# Patient Record
Sex: MALE | Race: WHITE | NOT HISPANIC OR LATINO | Employment: UNEMPLOYED | URBAN - METROPOLITAN AREA
[De-identification: names, ages, dates, MRNs, and addresses within clinical notes are randomized per-mention and may not be internally consistent; named-entity substitution may affect disease eponyms.]

---

## 2020-07-17 ENCOUNTER — APPOINTMENT (OUTPATIENT)
Dept: MRI IMAGING | Facility: HOSPITAL | Age: 40
End: 2020-07-17

## 2020-07-17 ENCOUNTER — HOSPITAL ENCOUNTER (EMERGENCY)
Facility: HOSPITAL | Age: 40
Discharge: HOME | End: 2020-07-17
Attending: EMERGENCY MEDICINE | Admitting: EMERGENCY MEDICINE

## 2020-07-17 VITALS
HEIGHT: 73 IN | RESPIRATION RATE: 18 BRPM | TEMPERATURE: 98.5 F | OXYGEN SATURATION: 95 % | WEIGHT: 215 LBS | HEART RATE: 90 BPM | BODY MASS INDEX: 28.49 KG/M2 | SYSTOLIC BLOOD PRESSURE: 139 MMHG | DIASTOLIC BLOOD PRESSURE: 95 MMHG

## 2020-07-17 DIAGNOSIS — M54.2 NECK PAIN: ICD-10-CM

## 2020-07-17 DIAGNOSIS — M54.42 ACUTE BILATERAL LOW BACK PAIN WITH LEFT-SIDED SCIATICA: ICD-10-CM

## 2020-07-17 DIAGNOSIS — S39.012A STRAIN OF LUMBAR REGION, INITIAL ENCOUNTER: ICD-10-CM

## 2020-07-17 DIAGNOSIS — S16.1XXA ACUTE STRAIN OF NECK MUSCLE, INITIAL ENCOUNTER: Primary | ICD-10-CM

## 2020-07-17 PROCEDURE — 72141 MRI NECK SPINE W/O DYE: CPT

## 2020-07-17 PROCEDURE — 72148 MRI LUMBAR SPINE W/O DYE: CPT

## 2020-07-17 PROCEDURE — 99284 EMERGENCY DEPT VISIT MOD MDM: CPT

## 2020-07-17 PROCEDURE — 99283 EMERGENCY DEPT VISIT LOW MDM: CPT

## 2020-07-17 RX ORDER — CYCLOBENZAPRINE HCL 10 MG
10 TABLET ORAL ONCE
Status: COMPLETED | OUTPATIENT
Start: 2020-07-17 | End: 2020-07-17

## 2020-07-17 RX ORDER — CYCLOBENZAPRINE HCL 10 MG
10 TABLET ORAL 3 TIMES DAILY PRN
Qty: 20 TABLET | Refills: 0 | Status: SHIPPED | OUTPATIENT
Start: 2020-07-17 | End: 2021-02-17

## 2020-07-17 RX ORDER — HYDROCODONE BITARTRATE AND ACETAMINOPHEN 10; 325 MG/1; MG/1
1 TABLET ORAL ONCE
Status: COMPLETED | OUTPATIENT
Start: 2020-07-17 | End: 2020-07-17

## 2020-07-17 RX ADMIN — CYCLOBENZAPRINE HYDROCHLORIDE 10 MG: 10 TABLET, FILM COATED ORAL at 14:59

## 2020-07-17 RX ADMIN — HYDROCODONE BITARTRATE AND ACETAMINOPHEN 1 TABLET: 10; 325 TABLET ORAL at 14:59

## 2020-07-17 NOTE — DISCHARGE INSTRUCTIONS
Rest with range of motion exercises only.  No vigorous physical activity or stressful activity    Use Tylenol or over-the-counter anti-inflammatory medications as needed for pain.    Use Flexeril as needed for muscle spasm.  Do not take Flexeril if he has any side effects.  No driving or dangerous activity as this is a sedating medication    You may use cool compresses or gentle heat to site    Follow-up with Dr. Grady at the beginning of the week to arrange physical therapy, which will be very important to your convalescence, as well as for close follow-up in view of your persistent pain and symptoms.    Follow-up with 1 of the primary care physicians from the list below for primary care treatment and coordination with orthopedist care    Return to the ED at once if you have any acute neurologic symptoms or other acute urgent emergent or progressive symptoms as discussed    Follow up with one of the [facility] Primary Care Providers below to setup primary care. If you need assistance coordinating a primary care appointment with a [facility] Primary Care Provider, please contact the Primary Care Coordinators at [phone] for appointment scheduling.    [facility], Primary Care   [address], [address]   [place], [place] [address] [phone]    [facility] Internal Medicine & Endocrinology  [address], [address]  [place] [address] [phone]    [facility] Family Medicine  [address], [address]   [place], [place] [address] [phone]    [facility] Primary Care  [address], [address]  [place], [place] [address] [phone]    [facility], Primary Care,   [address], [address]   [place], [place] [address] [phone]    [facility] Primary Care  [address]  [place], [place] [address] [phone]    [facility]  [facility], Primary Care  [address], [address]  [place] [address] [phone]    [facility] Internal Medicine & Pediatrics  [address], [address]   [place] [address] [phone]    [facility], Primary Care  [address] [place] [address] [phone]      [facility] Primary Care  [address], [address]   [place] [address] [phone]    [facility] Family Medicine  [address] [place] [address] [phone]
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
## 2020-07-20 ENCOUNTER — TELEPHONE (OUTPATIENT)
Dept: FAMILY MEDICINE CLINIC | Facility: CLINIC | Age: 40
End: 2020-07-20

## 2020-07-20 ENCOUNTER — OFFICE VISIT (OUTPATIENT)
Dept: FAMILY MEDICINE CLINIC | Facility: CLINIC | Age: 40
End: 2020-07-20

## 2020-07-20 VITALS
TEMPERATURE: 98.2 F | HEIGHT: 74 IN | OXYGEN SATURATION: 99 % | WEIGHT: 216 LBS | RESPIRATION RATE: 14 BRPM | DIASTOLIC BLOOD PRESSURE: 82 MMHG | BODY MASS INDEX: 27.72 KG/M2 | HEART RATE: 115 BPM | SYSTOLIC BLOOD PRESSURE: 130 MMHG

## 2020-07-20 DIAGNOSIS — M54.42 ACUTE BILATERAL LOW BACK PAIN WITH LEFT-SIDED SCIATICA: Primary | ICD-10-CM

## 2020-07-20 DIAGNOSIS — M54.2 NECK PAIN: ICD-10-CM

## 2020-07-20 PROCEDURE — 99203 OFFICE O/P NEW LOW 30 MIN: CPT | Performed by: PHYSICIAN ASSISTANT

## 2020-07-20 RX ORDER — DIVALPROEX SODIUM 500 MG/1
500 TABLET, DELAYED RELEASE ORAL 2 TIMES DAILY
COMMUNITY

## 2020-07-20 RX ORDER — CLONIDINE HYDROCHLORIDE 0.2 MG/1
0.2 TABLET ORAL
COMMUNITY

## 2020-07-20 RX ORDER — CLONAZEPAM 2 MG/1
2 TABLET ORAL 2 TIMES DAILY PRN
COMMUNITY

## 2020-07-20 RX ORDER — QUETIAPINE 200 MG/1
200 TABLET, FILM COATED, EXTENDED RELEASE ORAL NIGHTLY
COMMUNITY
End: 2020-11-19

## 2020-07-20 RX ORDER — HYDROCODONE BITARTRATE AND ACETAMINOPHEN 5; 325 MG/1; MG/1
1 TABLET ORAL EVERY 6 HOURS PRN
Qty: 20 TABLET | Refills: 0 | Status: SHIPPED | OUTPATIENT
Start: 2020-07-20 | End: 2021-02-17

## 2020-07-20 RX ORDER — FLUVOXAMINE MALEATE 100 MG/1
CAPSULE, EXTENDED RELEASE ORAL
COMMUNITY
End: 2020-11-19

## 2020-07-20 NOTE — TELEPHONE ENCOUNTER
Request for medication has been sent to doctor. May take awhile to be approved as they are still seeing patients. MARLEN

## 2020-07-20 NOTE — TELEPHONE ENCOUNTER
PT CALLED TO CHECK IF A PAIN MEDICATIONW AS CALLED IN FOR HIM  PT DID NOT KNOW THE NAME OF THE MEDICATION    [facility] [address] [place] [address], [place] [facility] [address] [facility] [phone] [facility] [phone]      PT CALL BACK NUMBER [id number]
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
## 2020-09-01 ENCOUNTER — OFFICE VISIT (OUTPATIENT)
Dept: FAMILY MEDICINE CLINIC | Facility: CLINIC | Age: 40
End: 2020-09-01

## 2020-09-01 ENCOUNTER — TELEPHONE (OUTPATIENT)
Dept: FAMILY MEDICINE CLINIC | Facility: CLINIC | Age: 40
End: 2020-09-01

## 2020-09-01 VITALS
OXYGEN SATURATION: 99 % | WEIGHT: 222 LBS | HEART RATE: 130 BPM | HEIGHT: 73 IN | TEMPERATURE: 98.4 F | BODY MASS INDEX: 29.42 KG/M2 | SYSTOLIC BLOOD PRESSURE: 130 MMHG | DIASTOLIC BLOOD PRESSURE: 86 MMHG | RESPIRATION RATE: 16 BRPM

## 2020-09-01 DIAGNOSIS — S39.91XA INJURY OF ABDOMINAL WALL, INITIAL ENCOUNTER: ICD-10-CM

## 2020-09-01 DIAGNOSIS — R16.1 SPLEEN ENLARGEMENT: ICD-10-CM

## 2020-09-01 DIAGNOSIS — R10.12 LEFT UPPER QUADRANT ABDOMINAL PAIN: Primary | ICD-10-CM

## 2020-09-01 DIAGNOSIS — R11.0 NAUSEA: ICD-10-CM

## 2020-09-01 DIAGNOSIS — R00.0 TACHYCARDIA: ICD-10-CM

## 2020-09-01 PROCEDURE — 99214 OFFICE O/P EST MOD 30 MIN: CPT | Performed by: PHYSICIAN ASSISTANT

## 2020-09-01 NOTE — TELEPHONE ENCOUNTER
Gabrielle from [facility] [employer] needs Yariel to sign off on most recent office note for CT to be cleared.

## 2020-09-01 NOTE — PROGRESS NOTES
Subjective   Deonte Galaviz is a 40 y.o. male.     History of Present Illness   Pt presents with CC of LUQ pain/ pain up under left rib cage   Pain started last Thursday when young nephew jumped on pt and kneed abdomen. Has sensation of pain and fullness ever since. Did debate on going to ER   States this area has hurt off and on for years. Sensation of fullness/ and dull ache of LUQ   Started 15 years ago after bad bout of mono. Told his spleen was enlarged and liver enzymes were elevated. No recent labs. Pt states enlarged spleen did not resolve after mono infection. No hx of blood disorder, frequent infections, easy bleeding or anemia   associated symptoms include nausea  No vomiting   occasional diarrhea  No fever. Denies chest pain or SOB   Is noted to be tachycardic in office, similar to last visit. States he is in a lot of discomfort   Has not tried anything for symptoms     The following portions of the patient's history were reviewed and updated as appropriate: allergies, current medications, past family history, past medical history, past social history, past surgical history and problem list.    Review of Systems   Constitutional: Negative.  Negative for chills, diaphoresis, fatigue and fever.   HENT: Negative.  Negative for congestion, ear discharge, ear pain, hearing loss, nosebleeds, postnasal drip, sinus pressure, sneezing and sore throat.    Eyes: Negative.    Respiratory: Negative.  Negative for cough, chest tightness, shortness of breath and wheezing.    Cardiovascular: Negative.  Negative for chest pain, palpitations and leg swelling.   Gastrointestinal: Positive for abdominal pain, diarrhea and nausea. Negative for abdominal distention, blood in stool, constipation and vomiting.   Genitourinary: Negative.  Negative for difficulty urinating, dysuria, flank pain, frequency, hematuria and urgency.   Skin: Negative.  Negative for color change, pallor, rash and wound.   Neurological: Negative for  "dizziness, syncope, weakness, light-headedness, numbness and headaches.       Objective    Blood pressure 130/86, pulse (!) 130, temperature 98.4 °F (36.9 °C), resp. rate 16, height 185.4 cm (73\"), weight 101 kg (222 lb), SpO2 99 %.     Physical Exam   Constitutional: He is oriented to person, place, and time. He appears well-developed and well-nourished.   HENT:   Head: Normocephalic and atraumatic.   Right Ear: External ear normal.   Left Ear: External ear normal.   Nose: Nose normal.   Mouth/Throat: Oropharynx is clear and moist. No oropharyngeal exudate.   Eyes: Conjunctivae are normal.   Neck: Normal range of motion. Neck supple. No tracheal deviation present. No thyromegaly present.   Cardiovascular: Regular rhythm, normal heart sounds and intact distal pulses. Tachycardia present. Exam reveals no gallop and no friction rub.   No murmur heard.  Pulmonary/Chest: Effort normal and breath sounds normal. No respiratory distress. He has no wheezes. He has no rales. He exhibits no tenderness.   Abdominal: Soft. Bowel sounds are normal. He exhibits no distension and no mass. There is tenderness in the left upper quadrant. There is guarding. There is no rebound. No hernia.       Musculoskeletal: Normal range of motion. He exhibits no edema, tenderness or deformity.   Lymphadenopathy:     He has no cervical adenopathy.   Neurological: He is alert and oriented to person, place, and time.   Skin: Skin is warm and dry.   Psychiatric: He has a normal mood and affect. His behavior is normal. Judgment and thought content normal.   Nursing note and vitals reviewed.      Assessment/Plan   Deonte was seen today for rib pain-l side.    Diagnoses and all orders for this visit:    Left upper quadrant abdominal pain  -     CBC & Differential  -     Comprehensive Metabolic Panel  -     Lipase  -     D-dimer, Quantitative  -     CT Abdomen Pelvis With & Without Contrast    Nausea  -     TSH  -     CT Abdomen Pelvis With & Without " Contrast    Spleen enlargement  -     Protime-INR  -     APTT  -     CT Abdomen Pelvis With & Without Contrast    Injury of abdominal wall, initial encounter  -     CT Abdomen Pelvis With & Without Contrast    Tachycardia  -     D-dimer, Quantitative    will proceed with CT imaging as noted. Pt concerned about recent injury and history of enlarged spleen.   Check labs as outlined in plan   Follow up pending imaging   Report to ER if new or worsening symptoms.

## 2020-09-02 ENCOUNTER — APPOINTMENT (OUTPATIENT)
Dept: CT IMAGING | Facility: HOSPITAL | Age: 40
End: 2020-09-02

## 2020-09-02 LAB
ALBUMIN SERPL-MCNC: 4 G/DL (ref 4–5)
ALBUMIN/GLOB SERPL: 1.8 {RATIO} (ref 1.2–2.2)
ALP SERPL-CCNC: 73 IU/L (ref 39–117)
ALT SERPL-CCNC: 39 IU/L (ref 0–44)
APTT PPP: 27 SEC (ref 24–33)
AST SERPL-CCNC: 32 IU/L (ref 0–40)
BASOPHILS # BLD AUTO: 0 X10E3/UL (ref 0–0.2)
BASOPHILS NFR BLD AUTO: 0 %
BILIRUB SERPL-MCNC: 0.3 MG/DL (ref 0–1.2)
BUN SERPL-MCNC: 20 MG/DL (ref 6–24)
BUN/CREAT SERPL: 25 (ref 9–20)
CALCIUM SERPL-MCNC: 9 MG/DL (ref 8.7–10.2)
CHLORIDE SERPL-SCNC: 108 MMOL/L (ref 96–106)
CO2 SERPL-SCNC: 26 MMOL/L (ref 20–29)
CREAT SERPL-MCNC: 0.8 MG/DL (ref 0.76–1.27)
D DIMER PPP FEU-MCNC: 0.45 MG/L FEU (ref 0–0.49)
EOSINOPHIL # BLD AUTO: 0.1 X10E3/UL (ref 0–0.4)
EOSINOPHIL NFR BLD AUTO: 2 %
ERYTHROCYTE [DISTWIDTH] IN BLOOD BY AUTOMATED COUNT: 12.6 % (ref 11.6–15.4)
GLOBULIN SER CALC-MCNC: 2.2 G/DL (ref 1.5–4.5)
GLUCOSE SERPL-MCNC: 91 MG/DL (ref 65–99)
HCT VFR BLD AUTO: 39.6 % (ref 37.5–51)
HGB BLD-MCNC: 13.2 G/DL (ref 13–17.7)
IMM GRANULOCYTES # BLD AUTO: 0 X10E3/UL (ref 0–0.1)
IMM GRANULOCYTES NFR BLD AUTO: 0 %
INR PPP: 1 (ref 0.8–1.2)
LIPASE SERPL-CCNC: 29 U/L (ref 13–78)
LYMPHOCYTES # BLD AUTO: 1.3 X10E3/UL (ref 0.7–3.1)
LYMPHOCYTES NFR BLD AUTO: 26 %
MCH RBC QN AUTO: 32 PG (ref 26.6–33)
MCHC RBC AUTO-ENTMCNC: 33.3 G/DL (ref 31.5–35.7)
MCV RBC AUTO: 96 FL (ref 79–97)
MONOCYTES # BLD AUTO: 0.6 X10E3/UL (ref 0.1–0.9)
MONOCYTES NFR BLD AUTO: 12 %
NEUTROPHILS # BLD AUTO: 2.9 X10E3/UL (ref 1.4–7)
NEUTROPHILS NFR BLD AUTO: 60 %
PLATELET # BLD AUTO: 317 X10E3/UL (ref 150–450)
POTASSIUM SERPL-SCNC: 4.3 MMOL/L (ref 3.5–5.2)
PROT SERPL-MCNC: 6.2 G/DL (ref 6–8.5)
PROTHROMBIN TIME: 10.6 SEC (ref 9.1–12)
RBC # BLD AUTO: 4.12 X10E6/UL (ref 4.14–5.8)
SODIUM SERPL-SCNC: 146 MMOL/L (ref 134–144)
TSH SERPL DL<=0.005 MIU/L-ACNC: 2.2 UIU/ML (ref 0.45–4.5)
WBC # BLD AUTO: 4.8 X10E3/UL (ref 3.4–10.8)

## 2020-11-19 ENCOUNTER — OFFICE VISIT (OUTPATIENT)
Dept: FAMILY MEDICINE CLINIC | Facility: CLINIC | Age: 40
End: 2020-11-19

## 2020-11-19 DIAGNOSIS — J20.9 ACUTE BRONCHITIS, UNSPECIFIED ORGANISM: Primary | ICD-10-CM

## 2020-11-19 PROCEDURE — 99442 PR PHYS/QHP TELEPHONE EVALUATION 11-20 MIN: CPT | Performed by: PHYSICIAN ASSISTANT

## 2020-11-19 RX ORDER — QUETIAPINE FUMARATE 200 MG/1
TABLET, FILM COATED ORAL
COMMUNITY
Start: 2020-10-30

## 2020-11-19 RX ORDER — FLUVOXAMINE MALEATE 100 MG
TABLET ORAL
COMMUNITY
Start: 2020-09-16

## 2020-11-19 RX ORDER — METHYLPREDNISOLONE 4 MG/1
TABLET ORAL
Qty: 21 EACH | Refills: 0 | Status: SHIPPED | OUTPATIENT
Start: 2020-11-19 | End: 2021-02-17

## 2020-11-19 RX ORDER — ALBUTEROL SULFATE 90 UG/1
2 AEROSOL, METERED RESPIRATORY (INHALATION) EVERY 4 HOURS PRN
Qty: 18 G | Refills: 0 | Status: SHIPPED | OUTPATIENT
Start: 2020-11-19

## 2020-11-19 RX ORDER — BENZONATATE 200 MG/1
200 CAPSULE ORAL 3 TIMES DAILY PRN
Qty: 30 CAPSULE | Refills: 0 | Status: SHIPPED | OUTPATIENT
Start: 2020-11-19 | End: 2020-11-23 | Stop reason: SDUPTHER

## 2020-11-19 RX ORDER — AZITHROMYCIN 250 MG/1
TABLET, FILM COATED ORAL
Qty: 6 TABLET | Refills: 0 | Status: SHIPPED | OUTPATIENT
Start: 2020-11-19 | End: 2021-02-17

## 2020-11-19 NOTE — PROGRESS NOTES
Subjective   Deonte Galaviz is a 40 y.o. male.     You have chosen to receive care through a telephone visit. Do you consent to use a telephone visit for your medical care today? Yes    History of Present Illness   Pt presents for telephone visit with CC of cough with mucus production (yellow), congestion, wheezing and SOB over the last several days   Lungs hurting he has been coughing so hard   No fever   No chills  No sudden loss of taste or smell   Has not gotten screened for COVID     The following portions of the patient's history were reviewed and updated as appropriate: allergies, current medications, past family history, past medical history, past social history, past surgical history and problem list.    Review of Systems   Constitutional: Positive for fatigue. Negative for chills, diaphoresis and fever.   HENT: Positive for congestion. Negative for ear discharge, ear pain, hearing loss, nosebleeds, postnasal drip, sinus pressure, sneezing and sore throat.    Eyes: Negative.    Respiratory: Positive for cough, chest tightness, shortness of breath and wheezing.    Cardiovascular: Negative.  Negative for chest pain, palpitations and leg swelling.   Gastrointestinal: Negative for abdominal distention, abdominal pain, blood in stool, constipation, diarrhea, nausea and vomiting.   Genitourinary: Negative for urgency.   Musculoskeletal: Positive for myalgias.   Skin: Negative for rash.   Neurological: Negative for dizziness, syncope, light-headedness and headaches.       Objective    Physical Exam   Unable to perform physical exam due to telephone visit     Assessment/Plan   Diagnoses and all orders for this visit:    1. Acute bronchitis, unspecified organism (Primary)  -     azithromycin (Zithromax Z-Aftab) 250 MG tablet; Take 2 tablets the first day, then 1 tablet daily for 4 days.  Dispense: 6 tablet; Refill: 0  -     methylPREDNISolone (MEDROL) 4 MG dose pack; Take as directed on package instructions.   Dispense: 21 each; Refill: 0  -     albuterol sulfate  (90 Base) MCG/ACT inhaler; Inhale 2 puffs Every 4 (Four) Hours As Needed for Wheezing or Shortness of Air.  Dispense: 18 g; Refill: 0  -     benzonatate (TESSALON) 200 MG capsule; Take 1 capsule by mouth 3 (Three) Times a Day As Needed for Cough.  Dispense: 30 capsule; Refill: 0  -     COVID-19,[facility] ROUTINE, NP/OP SWAB IN TRANSPORT MEDIA OR ESWAB 72 HR TAT - Swab, Nasopharynx    symptoms appear consistent with bronchitis. begin treatment as outlined in plan. Encourage screening for COVID 19. Pt will report to office   F/u pending COVID testing.   Report to UC or ER if new or worsening symptoms develop     This visit has been rescheduled as a phone visit to comply with patient safety concerns in accordance with CDC recommendations. Total time of discussion was 12 minutes.

## 2020-11-23 ENCOUNTER — TELEPHONE (OUTPATIENT)
Dept: FAMILY MEDICINE CLINIC | Facility: CLINIC | Age: 40
End: 2020-11-23

## 2020-11-23 DIAGNOSIS — J20.9 ACUTE BRONCHITIS, UNSPECIFIED ORGANISM: ICD-10-CM

## 2020-11-23 RX ORDER — BENZONATATE 200 MG/1
200 CAPSULE ORAL 3 TIMES DAILY PRN
Qty: 30 CAPSULE | Refills: 0 | Status: SHIPPED | OUTPATIENT
Start: 2020-11-23 | End: 2021-02-17

## 2020-11-23 NOTE — TELEPHONE ENCOUNTER
Caller: Anastacia Deonte    Relationship: Self    Best call back number:[phone]   Medication needed:   Requested Prescriptions     Pending Prescriptions Disp Refills   • benzonatate (TESSALON) 200 MG capsule 30 capsule 0     Sig: Take 1 capsule by mouth 3 (Three) Times a Day As Needed for Cough.       When do you need the refill by: ASAP - NEEDS THIS WEEK PLEASE  What details did the patient provide when requesting the medication: DROPPED MEDICATION AND THEY GOT WET    Does the patient have less than a 3 day supply:  [] Yes  [x] No    What is the patient's preferred [facility] [place] [address] [facility] [place] [facility] [phone] [facility] [phone]             no vomiting/no fever

## 2020-11-23 NOTE — TELEPHONE ENCOUNTER
Caller: Deonte Galaviz    Relationship to patient: Self    Best call back number: [phone]    THE PATIENT IS REQUESTING A CALL BACK WITH WITH COVID 19 TEST RESULTS.     PLEASE CALL PATIENT [phone]

## 2020-11-24 ENCOUNTER — TELEPHONE (OUTPATIENT)
Dept: FAMILY MEDICINE CLINIC | Facility: CLINIC | Age: 40
End: 2020-11-24

## 2020-11-24 LAB — SPECIMEN STATUS: NORMAL

## 2020-11-24 NOTE — TELEPHONE ENCOUNTER
PATIENT CALLED AND WANTED TO KNOW THE RESULTS OF HIS COVD TEST; PLEASE CALL AND ADVISE    KANIKA: [phone]

## 2020-11-24 NOTE — TELEPHONE ENCOUNTER
Ok let patient know. Unsure why they are not back yet. Encourage if we do not get results back in next 24 hours he go to outside testing site for rapid testing. There are no charges for covid testing. MARLEN

## 2020-11-24 NOTE — TELEPHONE ENCOUNTER
"Not seeing results. Did patient come in day of telephone visit 11/19 or the following day? Please check with Melissa to see if results are available. By order states \"needs to be collected\". Please see what [facility] says and contact patient with findings. Results should not be taking this long. KNH  "

## 2020-11-25 NOTE — TELEPHONE ENCOUNTER
Per Lilo ([facility] Tech) results are still pending. Thinking bc they are so backed up.     Lvm informing pt of this.

## 2020-11-26 LAB
SARS-COV-2 RNA RESP QL NAA+PROBE: NOT DETECTED
WRITTEN AUTHORIZATION: NORMAL

## 2021-02-17 ENCOUNTER — OFFICE VISIT (OUTPATIENT)
Dept: FAMILY MEDICINE CLINIC | Facility: CLINIC | Age: 41
End: 2021-02-17

## 2021-02-17 ENCOUNTER — HOSPITAL ENCOUNTER (OUTPATIENT)
Dept: CT IMAGING | Facility: HOSPITAL | Age: 41
Discharge: HOME | End: 2021-02-17

## 2021-02-17 ENCOUNTER — HOSPITAL ENCOUNTER (OUTPATIENT)
Dept: GENERAL RADIOLOGY | Facility: HOSPITAL | Age: 41
Discharge: HOME | End: 2021-02-17

## 2021-02-17 VITALS
SYSTOLIC BLOOD PRESSURE: 140 MMHG | WEIGHT: 263 LBS | OXYGEN SATURATION: 98 % | TEMPERATURE: 97.8 F | DIASTOLIC BLOOD PRESSURE: 82 MMHG | HEART RATE: 110 BPM | BODY MASS INDEX: 34.85 KG/M2 | RESPIRATION RATE: 18 BRPM | HEIGHT: 73 IN
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
## 2021-02-17 DIAGNOSIS — S09.93XA FACIAL INJURY, INITIAL ENCOUNTER: ICD-10-CM

## 2021-02-17 DIAGNOSIS — R07.81 RIB PAIN ON LEFT SIDE: ICD-10-CM

## 2021-02-17 PROCEDURE — 71101 X-RAY EXAM UNILAT RIBS/CHEST: CPT

## 2021-02-17 PROCEDURE — 70486 CT MAXILLOFACIAL W/O DYE: CPT

## 2021-02-17 PROCEDURE — 99213 OFFICE O/P EST LOW 20 MIN: CPT | Performed by: PHYSICIAN ASSISTANT

## 2021-02-17 RX ORDER — HYDROCODONE BITARTRATE AND ACETAMINOPHEN 7.5; 325 MG/1; MG/1
1 TABLET ORAL EVERY 6 HOURS PRN
Qty: 12 TABLET | Refills: 0 | Status: SHIPPED | OUTPATIENT
Start: 2021-02-17 | End: 2021-02-22 | Stop reason: SDUPTHER

## 2021-02-17 NOTE — PROGRESS NOTES
Subjective   Deonte Galaviz is a 40 y.o. male.     History of Present Illness   Pt presents following a fall on ice the day before yesterday   Was at home in driveway when he fell   Slipped on patch of ice landing on left side of face, nose and side  States nose took a brunt of the hit. Had nose bleed for about 2 hours after the fall. Bridge of nose very sore. Can breathe okay out nose now. Some bruising around nose   HA a couple hours after fall  Felt nauseated and sick to stomach. No vomiting   L orbit pain. Mild swelling   Pain along L lower rib cage extends to sternum   Denies vision changes  No SOB   Denies any significant back, neck, hip or knee injury during fall     Did not report to ER     The following portions of the patient's history were reviewed and updated as appropriate: allergies, current medications, past family history, past medical history, past social history, past surgical history and problem list.    Review of Systems   Constitutional: Negative.  Negative for chills, diaphoresis and fever.   HENT: Positive for facial swelling and nosebleeds. Negative for congestion, ear discharge, ear pain, hearing loss, postnasal drip, sinus pressure, sneezing and sore throat.    Eyes: Negative.    Respiratory: Negative.  Negative for cough, chest tightness, shortness of breath and wheezing.    Cardiovascular: Negative.  Negative for chest pain, palpitations and leg swelling.   Gastrointestinal: Positive for nausea. Negative for abdominal distention, abdominal pain, blood in stool, constipation, diarrhea and vomiting.   Genitourinary: Negative.  Negative for difficulty urinating, dysuria, flank pain, frequency, hematuria and urgency.   Musculoskeletal: Positive for arthralgias. Negative for back pain, gait problem, joint swelling, myalgias, neck pain and neck stiffness.   Skin: Positive for color change. Negative for pallor, rash and wound.   Neurological: Positive for headaches. Negative for dizziness,  "syncope, weakness, light-headedness and numbness.       Objective    Blood pressure 140/82, pulse 110, temperature 97.8 °F (36.6 °C), resp. rate 18, height 185.4 cm (73\"), weight 119 kg (263 lb), SpO2 98 %.     Physical Exam  Vitals signs and nursing note reviewed.   Constitutional:       Appearance: Normal appearance. He is well-developed.   HENT:      Head: Normocephalic and atraumatic.      Right Ear: Tympanic membrane, ear canal and external ear normal.      Left Ear: Tympanic membrane, ear canal and external ear normal.      Nose: Signs of injury and nasal tenderness present. No laceration.      Mouth/Throat:      Pharynx: No oropharyngeal exudate.   Eyes:      Extraocular Movements: Extraocular movements intact.      Conjunctiva/sclera: Conjunctivae normal.      Pupils: Pupils are equal, round, and reactive to light.      Comments: Severe bony TTP along L orbit    Neck:      Musculoskeletal: Normal range of motion and neck supple.      Thyroid: No thyromegaly.      Trachea: No tracheal deviation.   Cardiovascular:      Rate and Rhythm: Normal rate and regular rhythm.      Heart sounds: Normal heart sounds.   Pulmonary:      Effort: Pulmonary effort is normal. No respiratory distress.      Breath sounds: Normal breath sounds. No wheezing or rales.   Chest:      Chest wall: Tenderness present.       Lymphadenopathy:      Cervical: No cervical adenopathy.   Skin:     General: Skin is warm and dry.   Neurological:      Mental Status: He is alert and oriented to person, place, and time.   Psychiatric:         Behavior: Behavior normal.         Thought Content: Thought content normal.         Judgment: Judgment normal.         Assessment/Plan   Diagnoses and all orders for this visit:    1. Fall, initial encounter (Primary)  -     Cancel: XR Ribs Left With PA Chest  -     CT facial bones wo contrast  -     HYDROcodone-acetaminophen (Norco) 7.5-325 MG per tablet; Take 1 tablet by mouth Every 6 (Six) Hours As Needed " for Moderate Pain .  Dispense: 12 tablet; Refill: 0  -     XR Ribs Left With PA Chest    2. Facial injury, initial encounter  -     CT facial bones wo contrast  -     HYDROcodone-acetaminophen (Norco) 7.5-325 MG per tablet; Take 1 tablet by mouth Every 6 (Six) Hours As Needed for Moderate Pain .  Dispense: 12 tablet; Refill: 0    3. Rib pain on left side  -     Cancel: XR Ribs Left With PA Chest  -     HYDROcodone-acetaminophen (Norco) 7.5-325 MG per tablet; Take 1 tablet by mouth Every 6 (Six) Hours As Needed for Moderate Pain .  Dispense: 12 tablet; Refill: 0  -     XR Ribs Left With PA Chest    proceed with XR of L ribs and CT of facial bones due to extent of injury and reports of pain   Temporary pain management as outlined in plan   F/u pending imaging

## 2021-02-19 ENCOUNTER — TELEPHONE (OUTPATIENT)
Dept: FAMILY MEDICINE CLINIC | Facility: CLINIC | Age: 41
End: 2021-02-19

## 2021-02-19 NOTE — TELEPHONE ENCOUNTER
Caller: Deonte Galaviz    Relationship: Self    Best call back number: [phone]    Caller requesting test results: DEONTE    What test was performed: CT SCAN AND X RAYS OF LEFT RIBS      When was the test performed: 02/17/21    Additional notes: DEONTE IS LOOKING FOR THE RESULTS

## 2021-02-22 DIAGNOSIS — R07.81 RIB PAIN ON LEFT SIDE: ICD-10-CM
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
## 2021-02-22 DIAGNOSIS — S09.93XA FACIAL INJURY, INITIAL ENCOUNTER: ICD-10-CM

## 2021-02-22 RX ORDER — HYDROCODONE BITARTRATE AND ACETAMINOPHEN 7.5; 325 MG/1; MG/1
1 TABLET ORAL EVERY 6 HOURS PRN
Qty: 12 TABLET | Refills: 0 | Status: SHIPPED | OUTPATIENT
Start: 2021-02-22 | End: 2021-03-23

## 2021-02-22 NOTE — TELEPHONE ENCOUNTER
im confused. I saw patient after a fall where he was having eye socket pain, bridge of nose pain and rib pain. No fracture on imaging. What is this about a dentist appointment? MARLEN

## 2021-02-22 NOTE — TELEPHONE ENCOUNTER
Dr. Ding,     Pt did not mention chipped tooth at appointment. I did not check inside mouth due to COVID precautions and no reports of dental injury at time of appointment. I would say 3 additional days of pain management to get him to dentist appontment and then he will need to alternate tylenol and ibuprofen as our office can only prescribe temporary pain management.     Yariel

## 2021-02-22 NOTE — TELEPHONE ENCOUNTER
Caller: Deonte Galaviz    Relationship: Self    Best call back number: [phone]     Medication needed:   Requested Prescriptions     Pending Prescriptions Disp Refills   • HYDROcodone-acetaminophen (Norco) 7.5-325 MG per tablet 12 tablet 0     Sig: Take 1 tablet by mouth Every 6 (Six) Hours As Needed for Moderate Pain .       What details did the patient provide when requesting the medication: PATIENT WAS PRESCRIBED 3 DAYS OF THIS MEDICATION AND HE THOUGHT HE WOULD GET IN TO SEE HIS DENTIST SOON  HE CALLED THE DENTIST AND THE NEXT AVAILABLE APPOINTMENT FOR HIM IS Thursday 2-25-21  HE IS REQUESTING ENOUGH MEDICATION UNTIL HIS APPOINTMENT   ALSO PATIENT STATES EYE SOCKETS ARE STILL HURTING      Does the patient have less than a 3 day supply:  [x] Yes  [] No    What is the patient's preferred pharmacy: [facility] [address] [place] - [phone] [facility] [phone]

## 2021-02-22 NOTE — TELEPHONE ENCOUNTER
PATIENT CALLED TO CHECK STATUS OF REFILL ON HIS HYDROCODONE.    PATIENT STATED HE CHIPPED A TOOTH  AND HAVING EXCRUCIATING PAIN BUT HE WAS NOT ABLE TO GET AN APPOINTMENT WITH HIS DENTIST UNTIL Thursday 02/25.      PLEASE ADVISE:  [phone]           PHARMACY:      [facility] [address] [place] [address] [place] - [phone] [facility] [phone]   [phone]

## 2021-03-23 ENCOUNTER — OFFICE VISIT (OUTPATIENT)
Dept: FAMILY MEDICINE CLINIC | Facility: CLINIC | Age: 41
End: 2021-03-23

## 2021-03-23 VITALS
RESPIRATION RATE: 18 BRPM | HEART RATE: 80 BPM | HEIGHT: 73 IN | TEMPERATURE: 97.1 F | BODY MASS INDEX: 34.06 KG/M2 | DIASTOLIC BLOOD PRESSURE: 84 MMHG | WEIGHT: 257 LBS | SYSTOLIC BLOOD PRESSURE: 124 MMHG

## 2021-03-23 DIAGNOSIS — L02.91 ABSCESS: Primary | ICD-10-CM

## 2021-03-23 DIAGNOSIS — L98.8 FISTULA: ICD-10-CM

## 2021-03-23 PROCEDURE — 99213 OFFICE O/P EST LOW 20 MIN: CPT | Performed by: FAMILY MEDICINE

## 2021-03-23 RX ORDER — CLINDAMYCIN HYDROCHLORIDE 300 MG/1
300 CAPSULE ORAL 4 TIMES DAILY
Qty: 40 CAPSULE | Refills: 0 | Status: SHIPPED | OUTPATIENT
Start: 2021-03-23 | End: 2021-04-02

## 2021-03-23 RX ORDER — HYDROCODONE BITARTRATE AND ACETAMINOPHEN 5; 325 MG/1; MG/1
1 TABLET ORAL EVERY 6 HOURS PRN
Qty: 12 TABLET | Refills: 0 | Status: SHIPPED | OUTPATIENT
Start: 2021-03-23

## 2021-03-23 NOTE — PROGRESS NOTES
Subjective   Deonte Galaviz is a 40 y.o. male.     History of Present Illness     He had some soreness in his lisseth-rectal area  Then he felt some pressure behind his scrotum and anterior to rectum  The area got larger  He squeezed it and put pressure on this area with severe pain  Then 3/21 he had BM and then noted he had BRBPR and pus coming from the mass  Severe pain with sitting at this time in his rectal area      Review of Systems   Constitutional: Negative.    Psychiatric/Behavioral: Negative.        Objective   Physical Exam  Vitals and nursing note reviewed.   Constitutional:       General: He is not in acute distress.     Appearance: Normal appearance. He is well-developed.      Comments: Pt uncomfortable   Cardiovascular:      Rate and Rhythm: Normal rate and regular rhythm.      Heart sounds: Normal heart sounds.   Pulmonary:      Effort: Pulmonary effort is normal.      Breath sounds: Normal breath sounds.   Genitourinary:      Skin:         Neurological:      Mental Status: He is alert and oriented to person, place, and time.   Psychiatric:         Mood and Affect: Mood normal.         Behavior: Behavior normal.         Thought Content: Thought content normal.         Judgment: Judgment normal.         Assessment/Plan   Diagnoses and all orders for this visit:    1. Abscess (Primary)  -     CT pelvis w contrast; Future  -     HYDROcodone-acetaminophen (NORCO) 5-325 MG per tablet; Take 1 tablet by mouth Every 6 (Six) Hours As Needed for Severe Pain .  Dispense: 12 tablet; Refill: 0  -     clindamycin (CLEOCIN) 300 MG capsule; Take 1 capsule by mouth 4 (Four) Times a Day.  Dispense: 40 capsule; Refill: 0  -     Ambulatory Referral to Colorectal Surgery    2. Fistula  -     CT pelvis w contrast; Future  -     HYDROcodone-acetaminophen (NORCO) 5-325 MG per tablet; Take 1 tablet by mouth Every 6 (Six) Hours As Needed for Severe Pain .  Dispense: 12 tablet; Refill: 0  -     clindamycin (CLEOCIN) 300 MG  capsule; Take 1 capsule by mouth 4 (Four) Times a Day.  Dispense: 40 capsule; Refill: 0  -     Ambulatory Referral to Colorectal Surgery    I am concerned that he has a perirectal fistula.  I am going to check CT of pelvis and get colorectal involved to determine extent and look for cause of the infection.  Clindamycin QID and pain medicine short term

## 2021-03-31 ENCOUNTER — TELEPHONE (OUTPATIENT)
Dept: FAMILY MEDICINE CLINIC | Facility: CLINIC | Age: 41
End: 2021-03-31

## 2021-03-31 DIAGNOSIS — L98.8 FISTULA: ICD-10-CM

## 2021-03-31 DIAGNOSIS — L02.91 ABSCESS: Primary | ICD-10-CM

## 2021-03-31 RX ORDER — LINEZOLID 600 MG/1
600 TABLET, FILM COATED ORAL 2 TIMES DAILY
Qty: 20 TABLET | Refills: 0 | Status: SHIPPED | OUTPATIENT
Start: 2021-03-31 | End: 2021-03-31

## 2021-03-31 RX ORDER — PRAZOSIN HYDROCHLORIDE 2 MG/1
CAPSULE ORAL
COMMUNITY
Start: 2021-03-16

## 2021-03-31 RX ORDER — TEDIZOLID PHOSPHATE 200 MG/1
200 TABLET, FILM COATED ORAL DAILY
Qty: 6 TABLET | Refills: 0 | Status: SHIPPED | OUTPATIENT
Start: 2021-03-31 | End: 2021-04-02 | Stop reason: SDUPTHER

## 2021-03-31 NOTE — TELEPHONE ENCOUNTER
PHARMACY CALLED TO REPORT A DRUG INTERACTION WITH RX  linezolid (Zyvox) 600 MG tablet WITH fluvoxaMINE (LUVOX) 100 MG tablet    PLEASE ADVISE.  CALL BACK:[phone]

## 2021-03-31 NOTE — TELEPHONE ENCOUNTER
Can we get this CT done NOW?  It was ordered on 3/23 but scheduled for 4/7?  Can we get it done today PLEASE!    I want to change his antibiotic to something stronger as well.  Will use zyvox 600 BID, script sent in.  We simply need to get the CT done for further eval and are still working on colorectal.

## 2021-03-31 NOTE — TELEPHONE ENCOUNTER
PATIENT STATES INFECTION NOT BETTER.  FEELS LIKE IT'S SPREADING, FEELS PRESSURE, A LOT OF PAIN.      WHAT TO DO NOW?    PLEASE CALL [phone]

## 2021-04-01 ENCOUNTER — TELEPHONE (OUTPATIENT)
Dept: FAMILY MEDICINE CLINIC | Facility: CLINIC | Age: 41
End: 2021-04-01

## 2021-04-01 DIAGNOSIS — L02.91 ABSCESS: ICD-10-CM

## 2021-04-01 DIAGNOSIS — L98.8 FISTULA: ICD-10-CM

## 2021-04-01 NOTE — TELEPHONE ENCOUNTER
Caller: Deonte Galaviz    Relationship: Self    Best call back number: [phone]    Additional notes: DR ROSALES CALLED IN A STRONGER ANTIBIOTIC FOR THE PATIENT HOWEVER WHEN HE SPOKE TO THE PHARMACY THEY SAID THE COST WAS GOING TO BE VERY EXPENSIVE. PATIENT WILL BE NEEDING A PRIOR AUTHORIZATION. PATIENT ALSO STATED HE IS GETTING WORSE AS FAR AS PAIN GOES. HE IS FEELING PRESSURE UNDER HIS MID SECTION AND HAVING TESTICULAR PAIN EVERY 5-10 MINUTES. PATIENT WOULD LIKE TO KNOW IF THERE IS ANYTHING THAT CAN BE DONE TO HELP WITH THIS PAIN. PATIENT IS SCHEDULED TO HAVE CT SCAN 4/2/21 AND WILL BE SEEING THE SPECIALIST ON 4/6/21.     PHARMACY: [facility] [facility] [place] - [phone] [facility] [phone]   [phone]

## 2021-04-01 NOTE — TELEPHONE ENCOUNTER
Received approval on new antibiotic. Called pt and informed him you are out on Thursday's, if pain is worsening he needs to go on to the ER for an eval.

## 2021-04-01 NOTE — TELEPHONE ENCOUNTER
PATIENT STATES [facility] IS OUT OF THE MEDICATION AND WOULD LIKE THIS PRESCRIPTION SENT TO [facility].    PHARMACY: [id number] - [address] AT [facility] - [phone]  - [phone]   [phone]    CALL BACK [phone]

## 2021-04-02 ENCOUNTER — HOSPITAL ENCOUNTER (OUTPATIENT)
Dept: CT IMAGING | Facility: HOSPITAL | Age: 41
Discharge: HOME | End: 2021-04-02
Admitting: FAMILY MEDICINE

## 2021-04-02 DIAGNOSIS — L98.8 FISTULA: ICD-10-CM

## 2021-04-02 DIAGNOSIS — L02.91 ABSCESS: ICD-10-CM

## 2021-04-02 PROCEDURE — 72193 CT PELVIS W/DYE: CPT

## 2021-04-02 PROCEDURE — 82565 ASSAY OF CREATININE: CPT

## 2021-04-02 PROCEDURE — 25010000002 IOPAMIDOL 61 % SOLUTION: Performed by: FAMILY MEDICINE

## 2021-04-02 RX ORDER — TEDIZOLID PHOSPHATE 200 MG/1
200 TABLET, FILM COATED ORAL DAILY
Qty: 6 TABLET | Refills: 0 | Status: SHIPPED | OUTPATIENT
Start: 2021-04-02

## 2021-04-02 RX ADMIN — IOPAMIDOL 100 ML: 612 INJECTION, SOLUTION INTRAVENOUS at 15:32

## 2021-04-07 ENCOUNTER — APPOINTMENT (OUTPATIENT)
Dept: CT IMAGING | Facility: HOSPITAL | Age: 41
End: 2021-04-07

## 2021-04-08 ENCOUNTER — LAB (OUTPATIENT)
Dept: LAB | Facility: HOSPITAL | Age: 41
End: 2021-04-08

## 2021-04-08 ENCOUNTER — TRANSCRIBE ORDERS (OUTPATIENT)
Dept: LAB | Facility: HOSPITAL | Age: 41
End: 2021-04-08

## 2021-04-08 DIAGNOSIS — R53.0 NEOPLASTIC MALIGNANT RELATED FATIGUE: ICD-10-CM

## 2021-04-08 DIAGNOSIS — M19.90 SENILE ARTHRITIS: ICD-10-CM

## 2021-04-08 DIAGNOSIS — K62.89 ANAL OR RECTAL PAIN: ICD-10-CM

## 2021-04-08 DIAGNOSIS — K59.00 CONSTIPATION, UNSPECIFIED CONSTIPATION TYPE: ICD-10-CM

## 2021-04-08 DIAGNOSIS — I10 ESSENTIAL HYPERTENSION, MALIGNANT: ICD-10-CM

## 2021-04-08 DIAGNOSIS — K60.5 ANORECTAL FISTULA: ICD-10-CM

## 2021-04-08 DIAGNOSIS — K60.5 ANORECTAL FISTULA: Primary | ICD-10-CM

## 2021-04-08 DIAGNOSIS — K30 MILD DIETARY INDIGESTION: ICD-10-CM

## 2021-04-08 DIAGNOSIS — R63.4 LOSS OF WEIGHT: ICD-10-CM

## 2021-04-08 LAB
ALBUMIN SERPL-MCNC: 4.7 G/DL (ref 3.5–5.2)
ALBUMIN/GLOB SERPL: 2 G/DL
ALP SERPL-CCNC: 70 U/L (ref 39–117)
ALT SERPL W P-5'-P-CCNC: 23 U/L (ref 1–41)
ANION GAP SERPL CALCULATED.3IONS-SCNC: 14.2 MMOL/L (ref 5–15)
AST SERPL-CCNC: 17 U/L (ref 1–40)
BASOPHILS # BLD AUTO: 0.01 10*3/MM3 (ref 0–0.2)
BASOPHILS NFR BLD AUTO: 0.3 % (ref 0–1.5)
BILIRUB SERPL-MCNC: 0.2 MG/DL (ref 0–1.2)
BUN SERPL-MCNC: 21 MG/DL (ref 6–20)
BUN/CREAT SERPL: 15.6 (ref 7–25)
CALCIUM SPEC-SCNC: 8.7 MG/DL (ref 8.6–10.5)
CHLORIDE SERPL-SCNC: 103 MMOL/L (ref 98–107)
CO2 SERPL-SCNC: 23.8 MMOL/L (ref 22–29)
CREAT SERPL-MCNC: 1.35 MG/DL (ref 0.76–1.27)
DEPRECATED RDW RBC AUTO: 44.9 FL (ref 37–54)
EOSINOPHIL # BLD AUTO: 0.08 10*3/MM3 (ref 0–0.4)
EOSINOPHIL NFR BLD AUTO: 2 % (ref 0.3–6.2)
ERYTHROCYTE [DISTWIDTH] IN BLOOD BY AUTOMATED COUNT: 14.4 % (ref 12.3–15.4)
GFR SERPL CREATININE-BSD FRML MDRD: 59 ML/MIN/1.73
GLOBULIN UR ELPH-MCNC: 2.4 GM/DL
GLUCOSE SERPL-MCNC: 131 MG/DL (ref 65–99)
HBA1C MFR BLD: 5.4 % (ref 4.8–5.6)
HCT VFR BLD AUTO: 38.8 % (ref 37.5–51)
HGB BLD-MCNC: 14 G/DL (ref 13–17.7)
IMM GRANULOCYTES # BLD AUTO: 0.02 10*3/MM3 (ref 0–0.05)
IMM GRANULOCYTES NFR BLD AUTO: 0.5 % (ref 0–0.5)
LYMPHOCYTES # BLD AUTO: 1.06 10*3/MM3 (ref 0.7–3.1)
LYMPHOCYTES NFR BLD AUTO: 26.6 % (ref 19.6–45.3)
MCH RBC QN AUTO: 31.1 PG (ref 26.6–33)
MCHC RBC AUTO-ENTMCNC: 36.1 G/DL (ref 31.5–35.7)
MCV RBC AUTO: 86.2 FL (ref 79–97)
MONOCYTES # BLD AUTO: 0.3 10*3/MM3 (ref 0.1–0.9)
MONOCYTES NFR BLD AUTO: 7.5 % (ref 5–12)
NEUTROPHILS NFR BLD AUTO: 2.52 10*3/MM3 (ref 1.7–7)
NEUTROPHILS NFR BLD AUTO: 63.1 % (ref 42.7–76)
NRBC BLD AUTO-RTO: 0 /100 WBC (ref 0–0.2)
PLATELET # BLD AUTO: 118 10*3/MM3 (ref 140–450)
PMV BLD AUTO: 10.1 FL (ref 6–12)
POTASSIUM SERPL-SCNC: 3.8 MMOL/L (ref 3.5–5.2)
PROT SERPL-MCNC: 7.1 G/DL (ref 6–8.5)
QT INTERVAL: 332 MS
QTC INTERVAL: 434 MS
RBC # BLD AUTO: 4.5 10*6/MM3 (ref 4.14–5.8)
SODIUM SERPL-SCNC: 141 MMOL/L (ref 136–145)
WBC # BLD AUTO: 3.99 10*3/MM3 (ref 3.4–10.8)

## 2021-04-08 PROCEDURE — 85025 COMPLETE CBC W/AUTO DIFF WBC: CPT

## 2021-04-08 PROCEDURE — 80053 COMPREHEN METABOLIC PANEL: CPT

## 2021-04-08 PROCEDURE — 36415 COLL VENOUS BLD VENIPUNCTURE: CPT

## 2021-04-08 PROCEDURE — 83036 HEMOGLOBIN GLYCOSYLATED A1C: CPT

## 2021-04-08 PROCEDURE — 93005 ELECTROCARDIOGRAM TRACING: CPT | Performed by: COLON & RECTAL SURGERY

## 2021-04-08 PROCEDURE — 93010 ELECTROCARDIOGRAM REPORT: CPT | Performed by: INTERNAL MEDICINE

## 2021-04-13 LAB — CREAT BLDA-MCNC: 1.1 MG/DL (ref 0.6–1.3)

## 2021-04-28 ENCOUNTER — TELEPHONE (OUTPATIENT)
Dept: FAMILY MEDICINE CLINIC | Facility: CLINIC | Age: 41
End: 2021-04-28

## 2021-04-28 NOTE — TELEPHONE ENCOUNTER
Nothing I have seen yet. Pt can take orders to the [facility] down the sidewalk to have completed. Unfortunately lab eduard we use in office can not take lab orders from an outside provider. MARLEN

## 2021-04-28 NOTE — TELEPHONE ENCOUNTER
NELSON;PT CALLED    PT CALLED STATING DR KANIKA KHAN WAS GOING TO FAX OVER SOME LAB ORDERS AND HE WANTED TO KNOW IF NELSON WOULD ORDER THEM SO HE CAN COME HERE TO HAVE THEM DRAWN.      [phone]

## 2021-09-08 ENCOUNTER — TELEPHONE (OUTPATIENT)
Dept: FAMILY MEDICINE CLINIC | Facility: CLINIC | Age: 41
End: 2021-09-08

## 2021-09-08 NOTE — TELEPHONE ENCOUNTER
Caller: Deonte Galaviz    Relationship: Self    Best call back number: [phone]    What is the best time to reach you: ANYTIME IN THE AFTERNOON    Who are you requesting to speak with (clinical staff, provider,  specific staff member): PROIVER      What was the call regarding: PATIENT WANTS TO SEE ABOUT GETTING A VASECTOMY     Do you require a callback: YES

## 2021-09-09 NOTE — TELEPHONE ENCOUNTER
LVM for the patient to return our call, office number was provided      HUB TO READ: Please advise the patient that since we haven't seen him in close to 6 months we will need an appointment with the provider to discuss this before any referrals can be placed. IF the patient is okay with this please help him in scheduling. Thank you!

## 2021-10-05 ENCOUNTER — OFFICE VISIT (OUTPATIENT)
Dept: FAMILY MEDICINE CLINIC | Facility: CLINIC | Age: 41
End: 2021-10-05

## 2021-10-05 VITALS
SYSTOLIC BLOOD PRESSURE: 112 MMHG | TEMPERATURE: 98 F | HEIGHT: 73 IN | HEART RATE: 110 BPM | OXYGEN SATURATION: 97 % | DIASTOLIC BLOOD PRESSURE: 88 MMHG | BODY MASS INDEX: 34.57 KG/M2 | WEIGHT: 260.8 LBS

## 2021-10-05 DIAGNOSIS — Z30.09 VISIT FOR VASECTOMY EVALUATION: Primary | ICD-10-CM

## 2021-10-05 PROCEDURE — 99212 OFFICE O/P EST SF 10 MIN: CPT | Performed by: PHYSICIAN ASSISTANT

## 2021-11-01 ENCOUNTER — TELEPHONE (OUTPATIENT)
Dept: FAMILY MEDICINE CLINIC | Facility: CLINIC | Age: 41
End: 2021-11-01

## 2021-11-01 DIAGNOSIS — Z30.09 VISIT FOR VASECTOMY EVALUATION: Primary | ICD-10-CM

## 2021-11-01 NOTE — TELEPHONE ENCOUNTER
New referral placed. Can not guarantee that he will be able to obtain before his scheduled leave, but will put in as an urgent request.

## 2021-11-01 NOTE — TELEPHONE ENCOUNTER
Caller: Deonte Galaviz    Relationship: Self    Best call back number: [phone]    What is the medical concern/diagnosis: VASECTOMY       What specialty or service is being requested: UROLOGIST     What is the provider, practice or medical service name: N/A    What is the office location: [place]     What is the office phone number: N/A    Any additional details: PATIENTS CURRENT UROLOGIST IS IN [place] AND PATIENT HAS NOT BEEN PLEASED AND WOULD LIKE A NEW REFERRAL TO AN UROLOGIST  IN [place]. PLEASE ADVISE. PATIENT WILL ONLY BE IN [place] FOR ABOUT 2 MORE MONTHS BEFORE HE IS DEPLOYED, PATIENT NEES THIS ASAP.

## 2021-11-04 ENCOUNTER — TELEPHONE (OUTPATIENT)
Dept: FAMILY MEDICINE CLINIC | Facility: CLINIC | Age: 41
End: 2021-11-04

## 2021-11-04 NOTE — TELEPHONE ENCOUNTER
Pt scheduled at 5p today w/ Dr Ding for bad arm pain. Denies numbness/tingling and injury.  'Worse pain ever. Goes from forearm to shoulder blade.' Pt informed me he was in ICU last wk at [facility] for seizures. Informed pt to go to ER ASAP. Pt understood and agreed. Records have been requested. Dr Ding aware.

## 2022-08-23 ENCOUNTER — TELEPHONE (OUTPATIENT)
Dept: FAMILY MEDICINE CLINIC | Facility: CLINIC | Age: 42
End: 2022-08-23

## 2022-08-23 NOTE — TELEPHONE ENCOUNTER
HUB can share.  LVM on wife's number to schedule AWV that is Overdue. Please advise patient and schedule.   Thanks.

## 2024-01-04 ENCOUNTER — TELEPHONE (OUTPATIENT)
Dept: NEUROLOGY | Facility: OTHER | Age: 44
End: 2024-01-04
Payer: COMMERCIAL

## 2024-01-04 NOTE — TELEPHONE ENCOUNTER
PT IS FLYING HOME FROM [place] AND ONLY HAS A [employer] PHONE NUMBER SO HE IS ASKING US TO CALL HIS MOTHER [phone] TO SCHEDULE APPT FROM REFERRAL. HE WILL HAVE THE REFERRAL SENT [facility].    ASKED PT TO CALL US WITH AN UPDATED PHONE NUMBER WHEN HE HAS ONE.